# Patient Record
Sex: MALE | Race: BLACK OR AFRICAN AMERICAN | NOT HISPANIC OR LATINO | Employment: FULL TIME | URBAN - METROPOLITAN AREA
[De-identification: names, ages, dates, MRNs, and addresses within clinical notes are randomized per-mention and may not be internally consistent; named-entity substitution may affect disease eponyms.]

---

## 2018-03-19 ENCOUNTER — HOSPITAL ENCOUNTER (EMERGENCY)
Facility: MEDICAL CENTER | Age: 38
End: 2018-03-19
Attending: EMERGENCY MEDICINE
Payer: COMMERCIAL

## 2018-03-19 VITALS
BODY MASS INDEX: 22.69 KG/M2 | HEIGHT: 68 IN | OXYGEN SATURATION: 97 % | HEART RATE: 81 BPM | TEMPERATURE: 97.5 F | SYSTOLIC BLOOD PRESSURE: 138 MMHG | DIASTOLIC BLOOD PRESSURE: 75 MMHG | WEIGHT: 149.69 LBS | RESPIRATION RATE: 18 BRPM

## 2018-03-19 DIAGNOSIS — M54.12 CERVICAL RADICULOPATHY: ICD-10-CM

## 2018-03-19 PROCEDURE — 99284 EMERGENCY DEPT VISIT MOD MDM: CPT

## 2018-03-19 PROCEDURE — 700111 HCHG RX REV CODE 636 W/ 250 OVERRIDE (IP): Performed by: EMERGENCY MEDICINE

## 2018-03-19 PROCEDURE — 96374 THER/PROPH/DIAG INJ IV PUSH: CPT

## 2018-03-19 RX ORDER — KETOROLAC TROMETHAMINE 30 MG/ML
30 INJECTION, SOLUTION INTRAMUSCULAR; INTRAVENOUS ONCE
Status: COMPLETED | OUTPATIENT
Start: 2018-03-19 | End: 2018-03-19

## 2018-03-19 RX ORDER — KETOROLAC TROMETHAMINE 30 MG/ML
INJECTION, SOLUTION INTRAMUSCULAR; INTRAVENOUS
Status: DISCONTINUED
Start: 2018-03-19 | End: 2018-03-19 | Stop reason: HOSPADM

## 2018-03-19 RX ORDER — METHYLPREDNISOLONE 4 MG/1
TABLET ORAL
Qty: 21 TAB | Refills: 0 | Status: SHIPPED | OUTPATIENT
Start: 2018-03-19

## 2018-03-19 RX ADMIN — KETOROLAC TROMETHAMINE 30 MG: 30 INJECTION, SOLUTION INTRAMUSCULAR; INTRAVENOUS at 10:25

## 2018-03-19 ASSESSMENT — PAIN SCALES - GENERAL
PAINLEVEL_OUTOF10: 10
PAINLEVEL_OUTOF10: 8

## 2018-03-19 NOTE — DISCHARGE INSTRUCTIONS
Cervical Radiculopathy  Introduction  Cervical radiculopathy happens when a nerve in the neck (cervical nerve) is pinched or bruised. This condition can develop because of an injury or as part of the normal aging process. Pressure on the cervical nerves can cause pain or numbness that runs from the neck all the way down into the arm and fingers. Usually, this condition gets better with rest. Treatment may be needed if the condition does not improve.  What are the causes?  This condition may be caused by:  · Injury.  · Slipped (herniated) disk.  · Muscle tightness in the neck because of overuse.  · Arthritis.  · Breakdown or degeneration in the bones and joints of the spine (spondylosis) due to aging.  · Bone spurs that may develop near the cervical nerves.  What are the signs or symptoms?  Symptoms of this condition include:  · Pain that runs from the neck to the arm and hand. The pain can be severe or irritating. It may be worse when the neck is moved.  · Numbness or weakness in the affected arm and hand.  How is this diagnosed?  This condition may be diagnosed based on symptoms, medical history, and a physical exam. You may also have tests, including:  · X-rays.  · CT scan.  · MRI.  · Electromyogram (EMG).  · Nerve conduction tests.  How is this treated?  In many cases, treatment is not needed for this condition. With rest, the condition usually gets better over time. If treatment is needed, options may include:  · Wearing a soft neck collar for short periods of time.  · Physical therapy to strengthen your neck muscles.  · Medicines, such as NSAIDs, oral corticosteroids, or spinal injections.  · Surgery. This may be needed if other treatments do not help. Various types of surgery may be done depending on the cause of your problems.  Follow these instructions at home:  Managing pain  · Take over-the-counter and prescription medicines only as told by your health care provider.  · If directed, apply ice to the  affected area.  ¨ Put ice in a plastic bag.  ¨ Place a towel between your skin and the bag.  ¨ Leave the ice on for 20 minutes, 2-3 times per day.  · If ice does not help, you can try using heat. Take a warm shower or warm bath, or use a heat pack as told by your health care provider.  · Try a gentle neck and shoulder massage to help relieve symptoms.  Activity  · Rest as needed. Follow instructions from your health care provider about any restrictions on activities.  · Do stretching and strengthening exercises as told by your health care provider or physical therapist.  General instructions  · If you were given a soft collar, wear it as told by your health care provider.  · Use a flat pillow when you sleep.  · Keep all follow-up visits as told by your health care provider. This is important.  Contact a health care provider if:  · Your condition does not improve with treatment.  Get help right away if:  · Your pain gets much worse and cannot be controlled with medicines.  · You have weakness or numbness in your hand, arm, face, or leg.  · You have a high fever.  · You have a stiff, rigid neck.  · You lose control of your bowels or your bladder (have incontinence).  · You have trouble with walking, balance, or speaking.  This information is not intended to replace advice given to you by your health care provider. Make sure you discuss any questions you have with your health care provider.  Document Released: 09/12/2002 Document Revised: 05/25/2017 Document Reviewed: 02/11/2016  © 2017 [employer]

## 2018-03-19 NOTE — ED NOTES
Pt returned to room. demanding to see a dr. Pt again updated to delay and that the Dr. is with another pt. Pt refusing to change to gorene. Pt continues to be rude and yelling at myself.   Supervisor notified of pt behavior and yelling at staff.

## 2018-03-19 NOTE — ED NOTES
Pt declined awaiting erp evaluation left prior to being seen by erp. Pt refused to sign ama form. Ambulatory to triage.

## 2018-03-19 NOTE — ED NOTES
Ambulated to room, agree with triage note. Pt reports old pain and injury to left neck and shoulder worsening. New pain to right shoulder and neck

## 2018-03-19 NOTE — ED TRIAGE NOTES
Pt ambulates to triage  Chief Complaint   Patient presents with   • Neck Pain     strained neck the other day while working   • Shoulder Pain     both shoulders   hx of pinched nerve, burning pain from neck to both shoulders  Pt asked to wait in lobby, pt updated on triage process and pt asked to inform RN of any changes.

## 2018-03-19 NOTE — ED NOTES
"Patient up to lobby \"i want to know why you guys have me sitting on the bed and not doing anything, why are the doctors not seeing me\" escorted patient back to room to speak with his nurse  "

## 2018-03-19 NOTE — LETTER
Emergency Services     March 19, 2018    Patient: Carol Diaz   YOB: 1980   Date of Visit: 3/19/2018       To Whom It May Concern:    Carol Diaz was seen and treated in our emergency department on   3/19/2018, please excuse him from work.    Sincerely,     LINDA AUSTIN M.D.  [facility], EMERGENCY DEPT  Dept: [phone]

## 2018-03-19 NOTE — ED PROVIDER NOTES
ED Provider Note    Scribed for José Miguel Gillespie M.D. by Augustus Staples. 3/19/2018  10:00 AM    Primary care provider: None noted  Means of arrival: Walk in  History obtained from: Patient  History limited by: None    CHIEF COMPLAINT  Chief Complaint   Patient presents with   • Neck Pain     strained neck the other day while working   • Shoulder Pain     both shoulders       HPI  Carol Diaz is a 37 y.o. male who presents to the Emergency Department complaining of neck and shoulder pain. Patient states he has had a pinched nerve to his left neck area for a while now. He eventually developed pain to the right side of his neck as well. He has been having intermittent flare ups of the neck pain since 2012. However, patient has now been having more constant pain to his right neck and shoulder area over the last 4-5 days. He reports ice compressions typically provide alleviation to prior episodes of pain, however, his current neck pain is not alleviated with ice compressions.  He does not report any recent fevers. 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      REVIEW OF SYSTEMS  Pertinent positives include neck pain, bilateral shoulder pain.   Pertinent negatives include no recent fevers.    E    PAST MEDICAL HISTORY   None noted    SURGICAL HISTORY  patient denies any surgical history    SOCIAL HISTORY  Social History   Substance Use Topics   • Smoking status: Current Every Day Smoker   • Alcohol use Yes      Comment: occ      History   Drug use: Unknown       FAMILY HISTORY  History reviewed. No pertinent family history.    CURRENT MEDICATIONS  Home Medications     Reviewed by Shanice Granados R.N. (Registered Nurse) on 03/19/18 at 0910  Med List Status: Complete   Medication Last Dose Status        Patient Felipe Taking any Medications                       ALLERGIES  No Known Allergies    PHYSICAL EXAM  VITAL SIGNS: /81   Pulse 88   Temp  "36.2 °C (97.2 °F)   Resp 16   Ht 1.727 m (5' 8\")   Wt 67.9 kg (149 lb 11.1 oz)   SpO2 98%   BMI 22.76 kg/m²   Nursing note and vitals reviewed.  Constitutional: No distress.   HENT: Head is atraumatic. Oropharynx is moist.   Eyes: Conjunctivae are normal. Pupils are equal, round, and reactive to light.   Cardiovascular: Normal peripheral perfusion  Respiratory: No respiratory distress.   Musculoskeletal: Normal range of motion. Tenderness primarily to the right cervical paraspinal musculature extending to the right trapezius. No midline spinal tenderness.   Neurological: Alert. No focal deficits noted.    Skin: No rash.   Psych: Appropriate for clinical situation       COURSE & MEDICAL DECISION MAKING  Nursing notes, VS, PMSFHx reviewed in chart.     Review of past medical records shows the patient has no prior ED visits.     10:00 AM - Patient seen and examined at bedside. Patient will be treated with toradol 30 mg. The patient will be discharged with instructions regarding supportive care and medications including medrol. Instructions were given for appropriate follow-up. Discussed indications for seeking immediate medical attention. Patient was given the opportunity for questions. The patient understands and agrees.         The patient will return for new or worsening symptoms and is stable at the time of discharge.    The patient is referred to a primary physician for blood pressure management, diabetic screening, and for all other preventative health concerns.      DISPOSITION:  Patient will be discharged home in stable condition.    FOLLOW UP:  [facility], Emergency Dept  [address] [address] [phone]    If symptoms worsen      OUTPATIENT MEDICATIONS:  New Prescriptions    METHYLPREDNISOLONE (MEDROL) 4 MG TAB    Take as per the package instructions.        FINAL IMPRESSION  1. Cervical radiculopathy          IAugustus (Scribpatricia), howard scribing for, and " in the presence of, José Miguel Gillespie M.D..    Electronically signed by: Augustus Staples (Scribe), 3/19/2018    I, José Miguel Gillespie M.D. personally performed the services described in this documentation, as scribed by Augustus Staples in my presence, and it is both accurate and complete.    The note accurately reflects work and decisions made by me.  José Miguel Gillespie  3/19/2018  11:27 AM

## 2018-03-19 NOTE — ED NOTES
Discharged to self. Pt given prescription x 1 with indication. Pt to obtain and follow up with pcp.